# Patient Record
Sex: FEMALE | Race: WHITE
[De-identification: names, ages, dates, MRNs, and addresses within clinical notes are randomized per-mention and may not be internally consistent; named-entity substitution may affect disease eponyms.]

---

## 2017-07-25 NOTE — IPNPDOC
Text Note


Date of Service


The patient was seen on 7/25/17.





NOTE


POD1 prog note





States feeling well, no complaints.  No heavy VB.  Pain controlled.  Voiding, 

ambulatory.  Bonding well and breast feeding well.





VSSAF


CTAB


RRR


Ut at U-2, firm


Inc CDI, bandage removed (minimal strike thru)


Ext no CCE





UO adequate





CBC pending today





a/p:  Doing well.  d/c likely tomorrow.  Routine Postop care.





Sessions MD





VSArturo, I+O


VSArturo I+O





Vital Signs








  Date Time  Temp Pulse Resp B/P (MAP) Pulse Ox O2 Delivery O2 Flow Rate FiO2


 


7/25/17 05:16 98.0 64 16 105/52 (69) 97 Room Air  














I&O- Last 24 Hours up to 6 AM 


 


 7/25/17





 06:00


 


Intake Total 625 ml


 


Output Total 2100 ml


 


Balance -1475 ml

















MINESH TERRAZAS MD Jul 25, 2017 07:06

## 2017-07-26 NOTE — DS.PDOC
Discharge Summary


General


Date of Admission


2017 at 05:18


Date of Discharge


[id number]





Discharge Summary


PROCEDURES PERFORMED DURING STAY: Elective Repeat  Delivery and 

Bilateral Tubal Ligation





ADMITTING DIAGNOSIS:


1. Prior  and Undesired Fertility





DISCHARGE DIAGNOSES:


1. Healthy infant





HOSPITAL COURSE: Admitted for scheduled surgery.  Uncomplicated, see operative 

note.





DISCHARGE MEDICATIONS: Motrin, Percocet, Colace, Lanolin





Physical exam:  see note from this morning





LABORATORY DATA: Please see below.





ACTIVITY: as tolerated.  Nothing in vagina for 6 weeks.  No bathing for 4 

weeks.  No driving for 2 weeks. 





DIET: regular





DISPOSITION:stable





TIME SPENT ON DISCHARGE: Greater than 15 minutes.





Sessions MD





Vital Signs/I&Os





Vital Signs








  Date Time  Temp Pulse Resp B/P (MAP) Pulse Ox O2 Delivery O2 Flow Rate FiO2


 


17 06:00 98.3 65 16 91/54 (66) 97 Room Air  














I&O- Last 24 Hours up to 6 AM 


 


 17





 06:00


 


Intake Total 1920 ml


 


Balance 1920 ml











Discharge Medications


Scheduled


Docusate Sodium (Colace) 100 Mg Cap, 200 MG PO DAILY, (Reported)


Multivitamins/Prenatal (Prenatal 19) 1 Tab Tab, 1 TAB PO DAILY, (Reported)





Scheduled PRN


Acetaminophen (Tylenol Extra Strength) 500 Mg Tab, 1,000 MG PO Q6HP PRN for PAIN

, (Reported)





Allergies


Coded Allergies:  


     Levofloxacin (Verified  Allergy, Unknown, ANGIOEDEMA   FACE MOUTH, 17)











SESSIONS,MINESH CABRAL MD 2017 07:54

## 2017-07-26 NOTE — RO
DATE OF PROCEDURE:  2017

 

PREOPERATIVE DIAGNOSES:  Prior  section and undesired fertility.

 

POSTOPERATIVE DIAGNOSES:  Prior  section and undesired fertility.

 

OPERATIVE PROCEDURE:  Elective repeat  section and bilateral tubal

ligation.

 

SURGEON:  Dr. Dominik Lechuga

 

ASSISTANT:  Dr. Arun Amos

 

ANESTHESIA:  Spinal.

 

ESTIMATED BLOOD LOSS:  500 mL.

 

DRAINS:  850 mL of clear urine in a Vergara catheter at the end of the procedure.

 

FLUIDS REPLACED:  Lactated Ringer's 1800 mL.

 

SPECIMENS REMOVED:  Bilateral segments of fallopian tubes.

 

PREOPERATIVE ANTIBIOTICS:  Ancef 2 grams IV.

 

INDICATION:  Prior  and undesired fertility.

 

DESCRIPTION OF OPERATION:  Patient strongly desired a tubal ligation and repeat

 delivery, and informed consent was obtained in the clinic approximately

1 week prior to the scheduled surgery.  On day of surgery, there were no changes

and the patient desired to proceed as planned.

 

She was taken to the operating room with an IV in place, and a spinal anesthetic

was easily obtained.  Fetal heart tones after the spinal were normal.  Vergara

catheter was placed without difficulty, and a standard prep and drape was

performed without difficulty.  Spinal was confirmed to be adequate, and a

Pfannenstiel skin incision was performed over her prior incision without

difficulty down to the layer of the fascia, which was nicked in the midline.

This fascial incision was then extended bilaterally the extent of the skin

incision without difficulty.  Kocher clamps were placed on the upper lip of the

fascia, and the underlying rectus muscles were dissected off sharply.  This was

repeated inferiorly without difficulty.  The rectus muscles were  in the

midline, and with successive spreading maneuvers with two mosquitoes, we were

able to identify the peritoneum, which was entered sharply.  Digital exploration

revealed no scar tissue.  Stretching maneuver created an adequate peritoneal

window.

 

Bladder blade was placed.  Bladder flap was created.  A low transverse uterine

incision was easily created and stretched to adequacy.  Amniotomy showed clear

fluid, and the infant was delivered atraumatically with flexed head through the

uterine incision and with fundal pressure, the shoulders easily delivered as

well.  The vigorous infant was dried somewhat, and the cord was clamped times two

and cut.  Cord blood was obtained and the infant, which was still in good shape,

was walked to the warmer and the resuscitation team.  With traction, fundal

massage, and Pitocin running, the placenta was delivered through the incision and

all trailing membranes were removed as well.  The uterus was cleared of all clots

and debris with two dry sponges after being wrapped in a warm sponge.  The uterus

was then closed from left to right with a running suture of #0 Vicryl locked.  An

imbrication stitch was then performed in the same manner with #0 Monocryl.

Irrigation was performed behind the uterus, and attention was turned to the

fallopian tubes.

 

Starting with the left side, the mid isthmic portion was identified.  It was

tented up with a Palatine suture, and the mesosalpinx underneath the tube was

transected with Bovie cautery.  #2-0 gut sutures were placed through the loop,

and an approximately 1-2 cm size of fallopian tube was tied off and transected,

all done consistent with the Ellsworth method.  This was then repeated on the

patient's right side exactly the same as the left.

 

The uterus was returned to the abdomen, and the colic gutters were inspected as

well as the tubal sites.  The left side looked fine.  The right side was bleeding

somewhat from the serosal edges of the mesosalpinx.  This was addressed easily

with cautery.  Hemostasis noted.  The lower uterine incision was then inspected

and found to be hemostatic.  Running the peritoneal edges, a few small bleeders

were easily corrected with cautery.

 

We then placed four mosquito clamps at the peritoneal edges, which was tented up,

and closed with a running suture of #3-0 Vicryl.  Rectus muscles were very

closely reapproximated and, therefore, no sutures were needed.  Rectus belly was

inspected and found to be hemostatic, and the fascia was closed from left to

right with a running suture of #0 Vicryl.  The subcutaneous tissue was copiously

irrigated, made to be hemostatic with a small amount of cautery, and closed with

#3-0 Vicryl running from left to right.  The skin was closed with #4-0 Monocryl

in subcuticular fashion.  Steri-Strips were placed, pressure dressing was placed,

and patient's legs were frogged; and the uterus was firm at U -2 and, with a

bimanual exam, the vagina and cervix were cleared and, of note, there were no

clots or bleeding present whatsoever.

 

Patient was then transferred to the postanesthesia care unit (PACU) in stable

condition.  All counts were correct, including sponge, needle, and instruments.

## 2017-07-26 NOTE — IPNPDOC
Text Note


Date of Service


The patient was seen on 7/26/17.





NOTE


POD2 prog note





States feeling well, no complaints.  No heavy VB.  Pain controlled.  Voiding, 

ambulatory.  Bonding well and breast feeding well.





VSSAF


CTAB


RRR


Ut at 2, firm


Inc CDI


Ext no CCE





HCT 29.1 yest AM





a/p:  Doing well.  d/c today.  





Sessions MD





VSArturo, I+O


VSArturo I+O





Vital Signs








  Date Time  Temp Pulse Resp B/P (MAP) Pulse Ox O2 Delivery O2 Flow Rate FiO2


 


7/26/17 06:00 98.3 65 16 91/54 (66) 97 Room Air  














I&O- Last 24 Hours up to 6 AM 


 


 7/26/17





 06:00


 


Intake Total 1920 ml


 


Balance 1920 ml

















SESSIONS,MINESH CABRAL MD Jul 26, 2017 07:50

## 2017-08-01 NOTE — IPN
DATE: 2017

 

This patient and her  requested circumcision of their  male infant.

After discussing risks and benefits of circumcision, the medical and nonmedical

indications, penile block and aftercare, expressed understanding of penile block

and aftercare, signed and witnessed the consent form.  We await clearance by the

pediatrician.

## 2018-07-19 ENCOUNTER — HOSPITAL ENCOUNTER (OUTPATIENT)
Dept: HOSPITAL 53 - M SFHCLERA | Age: 33
End: 2018-07-19
Attending: NURSE PRACTITIONER
Payer: COMMERCIAL

## 2018-07-19 DIAGNOSIS — R30.0: Primary | ICD-10-CM

## 2018-07-20 LAB
CHLAMYDIA DNA AMPLIFICATION: NEGATIVE
GC DNA AMPLIFICATION: NEGATIVE

## 2018-09-29 ENCOUNTER — HOSPITAL ENCOUNTER (OUTPATIENT)
Dept: HOSPITAL 53 - M SFHCLERA | Age: 33
End: 2018-09-29
Attending: NURSE PRACTITIONER
Payer: COMMERCIAL

## 2018-09-29 DIAGNOSIS — R30.0: Primary | ICD-10-CM

## 2019-01-07 ENCOUNTER — HOSPITAL ENCOUNTER (EMERGENCY)
Dept: HOSPITAL 53 - M ED | Age: 34
Discharge: HOME | End: 2019-01-07
Payer: COMMERCIAL

## 2019-01-07 VITALS — BODY MASS INDEX: 25.77 KG/M2 | WEIGHT: 160.34 LBS | HEIGHT: 66 IN

## 2019-01-07 VITALS — DIASTOLIC BLOOD PRESSURE: 55 MMHG | SYSTOLIC BLOOD PRESSURE: 106 MMHG

## 2019-01-07 DIAGNOSIS — R10.9: Primary | ICD-10-CM

## 2019-01-07 DIAGNOSIS — J45.909: ICD-10-CM

## 2019-01-07 DIAGNOSIS — K76.89: ICD-10-CM

## 2019-01-07 LAB
ALBUMIN SERPL BCG-MCNC: 3.5 GM/DL (ref 3.2–5.2)
ALT SERPL W P-5'-P-CCNC: 21 U/L (ref 12–78)
AMYLASE SERPL-CCNC: 25 U/L (ref 25–115)
BASOPHILS # BLD AUTO: 0 10^3/UL (ref 0–0.2)
BASOPHILS NFR BLD AUTO: 0.3 % (ref 0–1)
BILIRUB CONJ SERPL-MCNC: < 0.1 MG/DL (ref 0–0.2)
BILIRUB SERPL-MCNC: 0.2 MG/DL (ref 0.2–1)
BUN SERPL-MCNC: 16 MG/DL (ref 7–18)
CALCIUM SERPL-MCNC: 7.8 MG/DL (ref 8.5–10.1)
CHLORIDE SERPL-SCNC: 108 MEQ/L (ref 98–107)
CO2 SERPL-SCNC: 26 MEQ/L (ref 21–32)
CREAT SERPL-MCNC: 0.76 MG/DL (ref 0.55–1.3)
EOSINOPHIL # BLD AUTO: 0.1 10^3/UL (ref 0–0.5)
EOSINOPHIL NFR BLD AUTO: 1.6 % (ref 0–3)
GFR SERPL CREATININE-BSD FRML MDRD: > 60 ML/MIN/{1.73_M2} (ref 60–?)
GLUCOSE SERPL-MCNC: 89 MG/DL (ref 70–100)
HCT VFR BLD AUTO: 40.7 % (ref 36–47)
HGB BLD-MCNC: 13.5 G/DL (ref 12–15.5)
LIPASE SERPL-CCNC: 167 U/L (ref 73–393)
LYMPHOCYTES # BLD AUTO: 1.2 10^3/UL (ref 1.5–4.5)
LYMPHOCYTES NFR BLD AUTO: 30 % (ref 24–44)
MCH RBC QN AUTO: 31 PG (ref 27–33)
MCHC RBC AUTO-ENTMCNC: 33.2 G/DL (ref 32–36.5)
MCV RBC AUTO: 93.3 FL (ref 80–96)
MONOCYTES # BLD AUTO: 0.2 10^3/UL (ref 0–0.8)
MONOCYTES NFR BLD AUTO: 5.9 % (ref 0–5)
NEUTROPHILS # BLD AUTO: 2.4 10^3/UL (ref 1.8–7.7)
NEUTROPHILS NFR BLD AUTO: 61.9 % (ref 36–66)
PLATELET # BLD AUTO: 226 10^3/UL (ref 150–450)
POTASSIUM SERPL-SCNC: 3.9 MEQ/L (ref 3.5–5.1)
PROT SERPL-MCNC: 6.9 GM/DL (ref 6.4–8.2)
RBC # BLD AUTO: 4.36 10^6/UL (ref 4–5.4)
SODIUM SERPL-SCNC: 140 MEQ/L (ref 136–145)
WBC # BLD AUTO: 3.9 10^3/UL (ref 4–10)

## 2019-01-07 PROCEDURE — 76705 ECHO EXAM OF ABDOMEN: CPT

## 2019-01-07 PROCEDURE — 96374 THER/PROPH/DIAG INJ IV PUSH: CPT

## 2019-01-07 PROCEDURE — 85025 COMPLETE CBC W/AUTO DIFF WBC: CPT

## 2019-01-07 PROCEDURE — 99284 EMERGENCY DEPT VISIT MOD MDM: CPT

## 2019-01-07 PROCEDURE — 80076 HEPATIC FUNCTION PANEL: CPT

## 2019-01-07 PROCEDURE — 80048 BASIC METABOLIC PNL TOTAL CA: CPT

## 2019-01-07 PROCEDURE — 96375 TX/PRO/DX INJ NEW DRUG ADDON: CPT

## 2019-01-07 PROCEDURE — 96361 HYDRATE IV INFUSION ADD-ON: CPT

## 2019-01-07 PROCEDURE — 81001 URINALYSIS AUTO W/SCOPE: CPT

## 2019-01-07 PROCEDURE — 83690 ASSAY OF LIPASE: CPT

## 2019-01-07 PROCEDURE — 74177 CT ABD & PELVIS W/CONTRAST: CPT

## 2019-01-07 PROCEDURE — 82150 ASSAY OF AMYLASE: CPT

## 2019-01-07 NOTE — REP
RIGHT UPPER QUADRANT SONOGRAPHY:

 

HISTORY: Epigastric pain and nausea.  Worse after eating.

 

COMPARISON STUDY:  No comparison study.

 

FINDINGS:  Scanning through the right upper quadrant of the abdomen demonstrates

a normal sized, thin-walled gallbladder without evidence of stone or polyp.

Common bile duct is normal measuring 0.3 cm in greatest diameter. There is a 1.8

x 1.6 x 1.4 cm hyperechoic nodule in the left lobe consistent with hemangioma. No

other focal liver lesion is seen.  Liver size is normal.  No pancreatic

abnormality is observed.  No right renal abnormality is seen.  There is no

evidence of ascites.  The right kidney measures 11.7 x 4.9 x 3.8 cm.

 

IMPRESSION: 1.8 cm hyperechoic nodule in the left lobe of the liver system with

hemangioma.  Consider MRI scanning.  Otherwise negative right upper quadrant

sonography.

 

 

Electronically Signed by

Jone Breen MD 01/07/2019 07:20 P

## 2019-01-07 NOTE — REP
CT ABDOMEN AND PELVIS WITH IV BUT WITHOUT ORAL CONTRAST:

 

HISTORY:  Epigastric pain and nausea.

 

No comparison CT imaging.  Sonography shows a 1.8 cm hyperechoic nodule in the

left lobe of the liver consistent with hemangioma.

 

CT CONTRAST DOSE:  100 mL  of intravenous Isovue 370 is administered.

 

FINDINGS:  Preliminary digital  radiograph is unremarkable.  The lung bases

are clear.  The liver is normal in size.  The left lobe lesion seen by sonography

is seen by CT and does appear to show some peripheral enhancement.  No other

focal liver lesion is seen.  Gallbladder is unremarkable.  No pancreatic

abnormalities observed.  No adrenal lesion is seen.  The spleen is homogeneous in

texture and normal in size.  The kidneys enhance symmetrically are

morphologically intact.  There are two small accessory splenules. No

retroperitoneal mass or adenopathy is observed.  Small and large intestinal bowel

loops are unremarkable.  A normal appendix is seen in the right lower quadrant.

No uterine or ovarian abnormality is seen.  Urinary bladder is largely empty but

intact.  No abdominal wall defect is observed.  Bone window settings show no bony

destructive lesion.

 

IMPRESSION:

 

2.2 cm left lobe liver lesion compatible with hemangioma.  This corresponds with

the ultrasound finding.  There are two small accessory splenules.  Normal

appendix.  Otherwise negative CT study abdomen and pelvis with IV contrast.

 

 

Electronically Signed by

Jone Breen MD 01/07/2019 07:22 P

## 2019-01-08 NOTE — ED PDOC
Post-Departure Follow-Up


ft drum fp faxed formal report of gb us for fu mlg Lundborg-Gray,Maja MD           Jan 8, 2019 12:06

## 2019-11-28 ENCOUNTER — HOSPITAL ENCOUNTER (EMERGENCY)
Dept: HOSPITAL 53 - M ED | Age: 34
Discharge: HOME | End: 2019-11-28
Payer: COMMERCIAL

## 2019-11-28 VITALS
DIASTOLIC BLOOD PRESSURE: 61 MMHG | BODY MASS INDEX: 28.66 KG/M2 | WEIGHT: 178.35 LBS | HEIGHT: 66 IN | SYSTOLIC BLOOD PRESSURE: 131 MMHG

## 2019-11-28 DIAGNOSIS — Z79.899: ICD-10-CM

## 2019-11-28 DIAGNOSIS — Z88.1: ICD-10-CM

## 2019-11-28 DIAGNOSIS — H10.9: Primary | ICD-10-CM

## 2020-02-13 ENCOUNTER — HOSPITAL ENCOUNTER (OUTPATIENT)
Dept: HOSPITAL 53 - M LAB REF | Age: 35
End: 2020-02-13
Attending: PHYSICIAN ASSISTANT
Payer: COMMERCIAL

## 2020-02-13 DIAGNOSIS — R30.0: Primary | ICD-10-CM

## 2020-02-22 ENCOUNTER — HOSPITAL ENCOUNTER (OUTPATIENT)
Dept: HOSPITAL 53 - M SFHCLERA | Age: 35
End: 2020-02-22
Attending: NURSE PRACTITIONER
Payer: COMMERCIAL

## 2020-02-22 DIAGNOSIS — R53.81: Primary | ICD-10-CM
